# Patient Record
Sex: FEMALE | Race: WHITE | NOT HISPANIC OR LATINO | Employment: UNEMPLOYED | URBAN - METROPOLITAN AREA
[De-identification: names, ages, dates, MRNs, and addresses within clinical notes are randomized per-mention and may not be internally consistent; named-entity substitution may affect disease eponyms.]

---

## 2017-01-17 ENCOUNTER — CONVERSION ENCOUNTER (OUTPATIENT)
Dept: MAMMOGRAPHY | Facility: HOSPITAL | Age: 42
End: 2017-01-17

## 2018-10-04 ENCOUNTER — TRANSCRIBE ORDERS (OUTPATIENT)
Dept: ORTHOPEDIC SURGERY | Facility: CLINIC | Age: 43
End: 2018-10-04

## 2018-10-04 DIAGNOSIS — N63.0 LUMP OF BREAST: ICD-10-CM

## 2018-10-04 DIAGNOSIS — N63.0 BREAST NODULE: Primary | ICD-10-CM

## 2018-10-22 ENCOUNTER — HOSPITAL ENCOUNTER (OUTPATIENT)
Dept: MAMMOGRAPHY | Facility: HOSPITAL | Age: 43
Discharge: HOME | End: 2018-10-22
Admitting: OBSTETRICS & GYNECOLOGY

## 2018-10-22 ENCOUNTER — APPOINTMENT (OUTPATIENT)
Dept: OTHER | Facility: HOSPITAL | Age: 43
End: 2018-10-22

## 2018-10-22 ENCOUNTER — HOSPITAL ENCOUNTER (OUTPATIENT)
Dept: ULTRASOUND IMAGING | Facility: HOSPITAL | Age: 43
Discharge: HOME | End: 2018-10-22

## 2018-10-22 DIAGNOSIS — N63.0 LUMP OF BREAST: ICD-10-CM

## 2018-10-22 DIAGNOSIS — N63.0 BREAST NODULE: ICD-10-CM

## 2018-10-22 DIAGNOSIS — Z92.89 HX OF MAMMOGRAM: ICD-10-CM

## 2018-10-22 PROCEDURE — 76642 ULTRASOUND BREAST LIMITED: CPT

## 2018-10-22 PROCEDURE — 77062 BREAST TOMOSYNTHESIS BI: CPT | Performed by: RADIOLOGY

## 2018-10-22 PROCEDURE — 77066 DX MAMMO INCL CAD BI: CPT | Performed by: RADIOLOGY

## 2018-10-22 PROCEDURE — G0279 TOMOSYNTHESIS, MAMMO: HCPCS

## 2018-10-22 PROCEDURE — 77066 DX MAMMO INCL CAD BI: CPT

## 2018-10-22 PROCEDURE — 76642 ULTRASOUND BREAST LIMITED: CPT | Performed by: RADIOLOGY

## 2018-12-05 ENCOUNTER — TRANSCRIBE ORDERS (OUTPATIENT)
Dept: ADMINISTRATIVE | Facility: HOSPITAL | Age: 43
End: 2018-12-05

## 2018-12-05 DIAGNOSIS — Z12.31 VISIT FOR SCREENING MAMMOGRAM: Primary | ICD-10-CM

## 2019-02-25 ENCOUNTER — HOSPITAL ENCOUNTER (OUTPATIENT)
Dept: MAMMOGRAPHY | Facility: HOSPITAL | Age: 44
Discharge: HOME | End: 2019-02-25
Admitting: OBSTETRICS & GYNECOLOGY

## 2019-02-25 DIAGNOSIS — Z12.31 VISIT FOR SCREENING MAMMOGRAM: ICD-10-CM

## 2019-02-25 PROCEDURE — 77067 SCR MAMMO BI INCL CAD: CPT

## 2019-02-25 PROCEDURE — 77063 BREAST TOMOSYNTHESIS BI: CPT | Performed by: RADIOLOGY

## 2019-02-25 PROCEDURE — 77067 SCR MAMMO BI INCL CAD: CPT | Performed by: RADIOLOGY

## 2019-02-25 PROCEDURE — 77063 BREAST TOMOSYNTHESIS BI: CPT

## 2019-07-10 ENCOUNTER — HOSPITAL ENCOUNTER (OUTPATIENT)
Dept: HOSPITAL 22 - PT | Age: 44
Discharge: HOME | End: 2019-07-10
Payer: COMMERCIAL

## 2019-07-10 DIAGNOSIS — M47.817: Primary | ICD-10-CM

## 2019-07-10 PROCEDURE — 97010 HOT OR COLD PACKS THERAPY: CPT

## 2019-07-10 PROCEDURE — 97164 PT RE-EVAL EST PLAN CARE: CPT

## 2019-07-10 PROCEDURE — 97163 PT EVAL HIGH COMPLEX 45 MIN: CPT

## 2019-07-10 PROCEDURE — 97014 ELECTRIC STIMULATION THERAPY: CPT

## 2019-07-10 PROCEDURE — G0283 ELEC STIM OTHER THAN WOUND: HCPCS

## 2019-07-10 PROCEDURE — 97110 THERAPEUTIC EXERCISES: CPT

## 2019-07-10 PROCEDURE — 97035 APP MDLTY 1+ULTRASOUND EA 15: CPT

## 2019-07-10 PROCEDURE — 97140 MANUAL THERAPY 1/> REGIONS: CPT

## 2020-08-21 ENCOUNTER — HOSPITAL ENCOUNTER (EMERGENCY)
Age: 45
Discharge: HOME | End: 2020-08-21
Payer: MEDICAID

## 2020-08-21 VITALS
DIASTOLIC BLOOD PRESSURE: 85 MMHG | OXYGEN SATURATION: 100 % | SYSTOLIC BLOOD PRESSURE: 124 MMHG | HEART RATE: 80 BPM | TEMPERATURE: 98.06 F | RESPIRATION RATE: 17 BRPM

## 2020-08-21 VITALS
DIASTOLIC BLOOD PRESSURE: 71 MMHG | OXYGEN SATURATION: 100 % | HEART RATE: 69 BPM | RESPIRATION RATE: 20 BRPM | SYSTOLIC BLOOD PRESSURE: 121 MMHG

## 2020-08-21 VITALS — SYSTOLIC BLOOD PRESSURE: 124 MMHG | HEART RATE: 69 BPM | OXYGEN SATURATION: 99 % | DIASTOLIC BLOOD PRESSURE: 64 MMHG

## 2020-08-21 VITALS
TEMPERATURE: 98.24 F | OXYGEN SATURATION: 98 % | HEART RATE: 73 BPM | DIASTOLIC BLOOD PRESSURE: 74 MMHG | SYSTOLIC BLOOD PRESSURE: 126 MMHG | RESPIRATION RATE: 17 BRPM

## 2020-08-21 VITALS — HEART RATE: 88 BPM | DIASTOLIC BLOOD PRESSURE: 89 MMHG | SYSTOLIC BLOOD PRESSURE: 140 MMHG | OXYGEN SATURATION: 99 %

## 2020-08-21 VITALS
HEART RATE: 84 BPM | OXYGEN SATURATION: 99 % | RESPIRATION RATE: 20 BRPM | SYSTOLIC BLOOD PRESSURE: 131 MMHG | DIASTOLIC BLOOD PRESSURE: 65 MMHG

## 2020-08-21 VITALS — BODY MASS INDEX: 25.7 KG/M2

## 2020-08-21 DIAGNOSIS — I10: ICD-10-CM

## 2020-08-21 DIAGNOSIS — R22.1: Primary | ICD-10-CM

## 2020-08-21 LAB
ALBUMIN LEVEL: 4.3 G/DL (ref 3.5–5)
ALBUMIN/GLOB SERPL: 1.7 {RATIO} (ref 1.1–1.8)
ALP ISO SERPL-ACNC: 30 U/L (ref 38–126)
ALT SERPLBLD-CCNC: 12 U/L (ref 12–78)
ANION GAP SERPL CALC-SCNC: 11.8 MEQ/L (ref 5–15)
AST SERPL QL: 22 U/L (ref 14–36)
BILIRUBIN,TOTAL: 1 MG/DL (ref 0.2–1.3)
BUN SERPL-MCNC: 8 MG/DL (ref 7–17)
CALCIUM SPEC-MCNC: 9.8 MG/DL (ref 8.4–10.2)
CHLORIDE SPEC-SCNC: 103 MMOL/L (ref 98–107)
CO2 SERPL-SCNC: 26 MMOL/L (ref 22–30)
CREAT BLD-SCNC: 0.8 MG/DL (ref 0.52–1.04)
CREATININE CLEARANCE ESTIMATED: 93 ML/MIN (ref 50–200)
ESTIMATED GLOMERULAR FILT RATE: 77 ML/MIN (ref 60–?)
GFR (AFRICAN AMERICAN): 93 ML/MIN (ref 60–?)
GLOBULIN SER CALC-MCNC: 2.5 G/DL (ref 1.3–3.2)
GLUCOSE: 93 MG/DL (ref 74–100)
HCT VFR BLD CALC: 37.5 % (ref 37–47)
HGB BLD-MCNC: 13.2 G/DL (ref 12.2–16.2)
MCHC RBC-ENTMCNC: 35.1 G/DL (ref 31.8–35.4)
MCV RBC: 90.3 FL (ref 81–99)
MEAN CORPUSCULAR HEMOGLOBIN: 31.7 PG (ref 27–31.2)
PLATELET # BLD: 337 K/MM3 (ref 142–424)
POTASSIUM: 3.8 MMOL/L (ref 3.5–5.1)
PROT SERPL-MCNC: 6.8 G/DL (ref 6.3–8.2)
RBC # BLD AUTO: 4.15 M/MM3 (ref 4.2–5.4)
SODIUM SPEC-SCNC: 137 MMOL/L (ref 136–145)
WBC # BLD AUTO: 7.9 K/MM3 (ref 4.8–10.8)

## 2020-08-21 PROCEDURE — 96375 TX/PRO/DX INJ NEW DRUG ADDON: CPT

## 2020-08-21 PROCEDURE — 85025 COMPLETE CBC W/AUTO DIFF WBC: CPT

## 2020-08-21 PROCEDURE — 87430 STREP A AG IA: CPT

## 2020-08-21 PROCEDURE — 96374 THER/PROPH/DIAG INJ IV PUSH: CPT

## 2020-08-21 PROCEDURE — 72052 X-RAY EXAM NECK SPINE 6/>VWS: CPT

## 2020-08-21 PROCEDURE — 80053 COMPREHEN METABOLIC PANEL: CPT

## 2020-08-21 PROCEDURE — 70360 X-RAY EXAM OF NECK: CPT

## 2020-08-21 PROCEDURE — 99283 EMERGENCY DEPT VISIT LOW MDM: CPT

## 2021-03-31 ENCOUNTER — HOSPITAL ENCOUNTER (OUTPATIENT)
Facility: HOSPITAL | Age: 46
Setting detail: OBSERVATION
Discharge: HOME | End: 2021-04-01
Attending: EMERGENCY MEDICINE | Admitting: INTERNAL MEDICINE

## 2021-03-31 DIAGNOSIS — T14.91XA SUICIDE ATTEMPT (HCC): Primary | ICD-10-CM

## 2021-03-31 DIAGNOSIS — T50.902A MEDICATION OVERDOSE, INTENTIONAL SELF-HARM, INITIAL ENCOUNTER (HCC): ICD-10-CM

## 2021-03-31 DIAGNOSIS — R41.82 ALTERED MENTAL STATUS, UNSPECIFIED ALTERED MENTAL STATUS TYPE: ICD-10-CM

## 2021-03-31 PROCEDURE — 93005 ELECTROCARDIOGRAM TRACING: CPT | Performed by: EMERGENCY MEDICINE

## 2021-03-31 PROCEDURE — 99285 EMERGENCY DEPT VISIT HI MDM: CPT

## 2021-04-01 VITALS
BODY MASS INDEX: 26.12 KG/M2 | DIASTOLIC BLOOD PRESSURE: 68 MMHG | TEMPERATURE: 96.7 F | SYSTOLIC BLOOD PRESSURE: 106 MMHG | RESPIRATION RATE: 16 BRPM | HEART RATE: 72 BPM | HEIGHT: 64 IN | OXYGEN SATURATION: 96 % | WEIGHT: 153 LBS

## 2021-04-01 PROBLEM — T14.91XA SUICIDE ATTEMPT (HCC): Status: ACTIVE | Noted: 2021-04-01

## 2021-04-01 PROBLEM — T48.201A OVERDOSE OF MUSCLE RELAXANT: Status: ACTIVE | Noted: 2021-04-01

## 2021-04-01 PROBLEM — T14.91XA SUICIDE ATTEMPT (HCC): Status: RESOLVED | Noted: 2021-04-01 | Resolved: 2021-04-01

## 2021-04-01 PROBLEM — T48.201A OVERDOSE OF MUSCLE RELAXANT: Status: RESOLVED | Noted: 2021-04-01 | Resolved: 2021-04-01

## 2021-04-01 PROBLEM — G89.4 CHRONIC PAIN SYNDROME: Status: ACTIVE | Noted: 2021-04-01

## 2021-04-01 LAB
ALBUMIN SERPL-MCNC: 4.5 G/DL (ref 3.5–5.2)
ALBUMIN/GLOB SERPL: 1.8 G/DL
ALP SERPL-CCNC: 45 U/L (ref 39–117)
ALT SERPL W P-5'-P-CCNC: 14 U/L (ref 1–33)
AMPHET+METHAMPHET UR QL: NEGATIVE
AMPHETAMINES UR QL: NEGATIVE
ANION GAP SERPL CALCULATED.3IONS-SCNC: 10 MMOL/L (ref 5–15)
APAP SERPL-MCNC: <5 MCG/ML (ref 0–30)
APAP SERPL-MCNC: <5 MCG/ML (ref 0–30)
AST SERPL-CCNC: 18 U/L (ref 1–32)
BARBITURATES UR QL SCN: NEGATIVE
BASOPHILS # BLD AUTO: 0.04 10*3/MM3 (ref 0–0.2)
BASOPHILS NFR BLD AUTO: 0.7 % (ref 0–1.5)
BENZODIAZ UR QL SCN: NEGATIVE
BILIRUB SERPL-MCNC: 0.2 MG/DL (ref 0–1.2)
BUN SERPL-MCNC: 10 MG/DL (ref 6–20)
BUN/CREAT SERPL: 10.1 (ref 7–25)
BUPRENORPHINE SERPL-MCNC: NEGATIVE NG/ML
CALCIUM SPEC-SCNC: 9.7 MG/DL (ref 8.6–10.5)
CANNABINOIDS SERPL QL: NEGATIVE
CHLORIDE SERPL-SCNC: 106 MMOL/L (ref 98–107)
CO2 SERPL-SCNC: 28 MMOL/L (ref 22–29)
COCAINE UR QL: NEGATIVE
CREAT SERPL-MCNC: 0.99 MG/DL (ref 0.57–1)
DEPRECATED RDW RBC AUTO: 42.7 FL (ref 37–54)
EOSINOPHIL # BLD AUTO: 0.11 10*3/MM3 (ref 0–0.4)
EOSINOPHIL NFR BLD AUTO: 1.8 % (ref 0.3–6.2)
ERYTHROCYTE [DISTWIDTH] IN BLOOD BY AUTOMATED COUNT: 12.5 % (ref 12.3–15.4)
ETHANOL BLD-MCNC: 142 MG/DL (ref 0–10)
FLUAV RNA RESP QL NAA+PROBE: NOT DETECTED
FLUBV RNA RESP QL NAA+PROBE: NOT DETECTED
GFR SERPL CREATININE-BSD FRML MDRD: 60 ML/MIN/1.73
GLOBULIN UR ELPH-MCNC: 2.5 GM/DL
GLUCOSE SERPL-MCNC: 91 MG/DL (ref 65–99)
HCT VFR BLD AUTO: 39.9 % (ref 34–46.6)
HGB BLD-MCNC: 13 G/DL (ref 12–15.9)
IMM GRANULOCYTES # BLD AUTO: 0.02 10*3/MM3 (ref 0–0.05)
IMM GRANULOCYTES NFR BLD AUTO: 0.3 % (ref 0–0.5)
LYMPHOCYTES # BLD AUTO: 2.71 10*3/MM3 (ref 0.7–3.1)
LYMPHOCYTES NFR BLD AUTO: 44.6 % (ref 19.6–45.3)
MCH RBC QN AUTO: 30.5 PG (ref 26.6–33)
MCHC RBC AUTO-ENTMCNC: 32.6 G/DL (ref 31.5–35.7)
MCV RBC AUTO: 93.7 FL (ref 79–97)
METHADONE UR QL SCN: NEGATIVE
MONOCYTES # BLD AUTO: 0.52 10*3/MM3 (ref 0.1–0.9)
MONOCYTES NFR BLD AUTO: 8.6 % (ref 5–12)
NEUTROPHILS NFR BLD AUTO: 2.67 10*3/MM3 (ref 1.7–7)
NEUTROPHILS NFR BLD AUTO: 44 % (ref 42.7–76)
NRBC BLD AUTO-RTO: 0 /100 WBC (ref 0–0.2)
OPIATES UR QL: NEGATIVE
OXYCODONE UR QL SCN: NEGATIVE
PCP UR QL SCN: NEGATIVE
PLATELET # BLD AUTO: 390 10*3/MM3 (ref 140–450)
PMV BLD AUTO: 9.9 FL (ref 6–12)
POTASSIUM SERPL-SCNC: 4.1 MMOL/L (ref 3.5–5.2)
PROPOXYPH UR QL: NEGATIVE
PROT SERPL-MCNC: 7 G/DL (ref 6–8.5)
QT INTERVAL: 400 MS
QTC INTERVAL: 452 MS
RBC # BLD AUTO: 4.26 10*6/MM3 (ref 3.77–5.28)
SALICYLATES SERPL-MCNC: <0.3 MG/DL
SARS-COV-2 RNA RESP QL NAA+PROBE: NOT DETECTED
SODIUM SERPL-SCNC: 144 MMOL/L (ref 136–145)
TRICYCLICS UR QL SCN: NEGATIVE
WBC # BLD AUTO: 6.07 10*3/MM3 (ref 3.4–10.8)

## 2021-04-01 PROCEDURE — 80179 DRUG ASSAY SALICYLATE: CPT | Performed by: EMERGENCY MEDICINE

## 2021-04-01 PROCEDURE — 80143 DRUG ASSAY ACETAMINOPHEN: CPT | Performed by: EMERGENCY MEDICINE

## 2021-04-01 PROCEDURE — 96374 THER/PROPH/DIAG INJ IV PUSH: CPT

## 2021-04-01 PROCEDURE — 80053 COMPREHEN METABOLIC PANEL: CPT | Performed by: EMERGENCY MEDICINE

## 2021-04-01 PROCEDURE — G0378 HOSPITAL OBSERVATION PER HR: HCPCS

## 2021-04-01 PROCEDURE — 80306 DRUG TEST PRSMV INSTRMNT: CPT | Performed by: EMERGENCY MEDICINE

## 2021-04-01 PROCEDURE — 80143 DRUG ASSAY ACETAMINOPHEN: CPT | Performed by: INTERNAL MEDICINE

## 2021-04-01 PROCEDURE — 96372 THER/PROPH/DIAG INJ SC/IM: CPT

## 2021-04-01 PROCEDURE — 99217 PR OBSERVATION CARE DISCHARGE MANAGEMENT: CPT | Performed by: NURSE PRACTITIONER

## 2021-04-01 PROCEDURE — 85025 COMPLETE CBC W/AUTO DIFF WBC: CPT | Performed by: EMERGENCY MEDICINE

## 2021-04-01 PROCEDURE — 99220 PR INITIAL OBSERVATION CARE/DAY 70 MINUTES: CPT | Performed by: INTERNAL MEDICINE

## 2021-04-01 PROCEDURE — 25010000002 ONDANSETRON PER 1 MG: Performed by: INTERNAL MEDICINE

## 2021-04-01 PROCEDURE — 25010000002 THIAMINE PER 100 MG: Performed by: INTERNAL MEDICINE

## 2021-04-01 PROCEDURE — 82077 ASSAY SPEC XCP UR&BREATH IA: CPT | Performed by: EMERGENCY MEDICINE

## 2021-04-01 PROCEDURE — 87636 SARSCOV2 & INF A&B AMP PRB: CPT | Performed by: INTERNAL MEDICINE

## 2021-04-01 PROCEDURE — 25010000002 HEPARIN (PORCINE) PER 1000 UNITS: Performed by: INTERNAL MEDICINE

## 2021-04-01 RX ORDER — OMEPRAZOLE 40 MG/1
40 CAPSULE, DELAYED RELEASE ORAL DAILY
COMMUNITY

## 2021-04-01 RX ORDER — OXYCODONE HYDROCHLORIDE 5 MG/1
10 TABLET ORAL EVERY 6 HOURS PRN
Status: DISCONTINUED | OUTPATIENT
Start: 2021-04-01 | End: 2021-04-01 | Stop reason: HOSPADM

## 2021-04-01 RX ORDER — HEPARIN SODIUM 5000 [USP'U]/ML
5000 INJECTION, SOLUTION INTRAVENOUS; SUBCUTANEOUS EVERY 8 HOURS SCHEDULED
Status: DISCONTINUED | OUTPATIENT
Start: 2021-04-01 | End: 2021-04-01 | Stop reason: HOSPADM

## 2021-04-01 RX ORDER — FLUOXETINE HYDROCHLORIDE 20 MG/1
40 CAPSULE ORAL 2 TIMES DAILY
COMMUNITY
End: 2021-11-16

## 2021-04-01 RX ORDER — SODIUM CHLORIDE 0.9 % (FLUSH) 0.9 %
10 SYRINGE (ML) INJECTION AS NEEDED
Status: DISCONTINUED | OUTPATIENT
Start: 2021-04-01 | End: 2021-04-01 | Stop reason: HOSPADM

## 2021-04-01 RX ORDER — LISINOPRIL 10 MG/1
10 TABLET ORAL NIGHTLY
COMMUNITY

## 2021-04-01 RX ORDER — ONDANSETRON 2 MG/ML
4 INJECTION INTRAMUSCULAR; INTRAVENOUS EVERY 6 HOURS PRN
Status: DISCONTINUED | OUTPATIENT
Start: 2021-04-01 | End: 2021-04-01 | Stop reason: HOSPADM

## 2021-04-01 RX ORDER — LAMOTRIGINE 100 MG/1
100 TABLET ORAL NIGHTLY
Status: DISCONTINUED | OUTPATIENT
Start: 2021-04-01 | End: 2021-04-01 | Stop reason: HOSPADM

## 2021-04-01 RX ORDER — LURASIDONE HYDROCHLORIDE 40 MG/1
40 TABLET, FILM COATED ORAL DAILY
COMMUNITY

## 2021-04-01 RX ORDER — OXYCODONE HYDROCHLORIDE 10 MG/1
10 TABLET ORAL EVERY 6 HOURS PRN
COMMUNITY
End: 2021-11-16

## 2021-04-01 RX ORDER — TIZANIDINE 4 MG/1
2 TABLET ORAL NIGHTLY PRN
COMMUNITY
End: 2021-04-01 | Stop reason: HOSPADM

## 2021-04-01 RX ORDER — SODIUM CHLORIDE 0.9 % (FLUSH) 0.9 %
10 SYRINGE (ML) INJECTION EVERY 12 HOURS SCHEDULED
Status: DISCONTINUED | OUTPATIENT
Start: 2021-04-01 | End: 2021-04-01 | Stop reason: HOSPADM

## 2021-04-01 RX ORDER — CLONIDINE HYDROCHLORIDE 0.1 MG/1
0.1 TABLET ORAL NIGHTLY PRN
COMMUNITY

## 2021-04-01 RX ORDER — FLUOXETINE HYDROCHLORIDE 20 MG/1
40 CAPSULE ORAL EVERY 12 HOURS SCHEDULED
Status: DISCONTINUED | OUTPATIENT
Start: 2021-04-01 | End: 2021-04-01 | Stop reason: HOSPADM

## 2021-04-01 RX ORDER — LAMOTRIGINE 100 MG/1
100 TABLET ORAL NIGHTLY
COMMUNITY

## 2021-04-01 RX ADMIN — THIAMINE HYDROCHLORIDE 100 ML/HR: 100 INJECTION, SOLUTION INTRAMUSCULAR; INTRAVENOUS at 08:56

## 2021-04-01 RX ADMIN — HEPARIN SODIUM 5000 UNITS: 5000 INJECTION INTRAVENOUS; SUBCUTANEOUS at 04:48

## 2021-04-01 RX ADMIN — ONDANSETRON 4 MG: 2 INJECTION INTRAMUSCULAR; INTRAVENOUS at 04:46

## 2021-04-01 RX ADMIN — SODIUM CHLORIDE, PRESERVATIVE FREE 10 ML: 5 INJECTION INTRAVENOUS at 08:56

## 2021-09-08 ENCOUNTER — TELEPHONE (OUTPATIENT)
Dept: NEUROSURGERY | Facility: CLINIC | Age: 46
End: 2021-09-08

## 2021-09-08 NOTE — TELEPHONE ENCOUNTER
Caller: Nisha Lou    Relationship to patient: Self    Best call back number: [phone]   Patient is needing: PATIENT STATES THAT A REFERRAL WAS SENT [phone] ON 8/26/21 FROM DR. NIX FOR DX SEVERE CARPAL TUNNEL.    PLEASE CALL THE PATIENT TO SCHEDULE @  [phone]     THANK YOU

## 2021-11-16 ENCOUNTER — OFFICE VISIT (OUTPATIENT)
Dept: NEUROSURGERY | Facility: CLINIC | Age: 46
End: 2021-11-16

## 2021-11-16 VITALS
TEMPERATURE: 97.5 F | HEART RATE: 66 BPM | BODY MASS INDEX: 27.55 KG/M2 | OXYGEN SATURATION: 98 % | WEIGHT: 161.4 LBS | HEIGHT: 64 IN

## 2021-11-16 DIAGNOSIS — G56.02 CARPAL TUNNEL SYNDROME OF LEFT WRIST: Primary | ICD-10-CM

## 2021-11-16 PROCEDURE — 99204 OFFICE O/P NEW MOD 45 MIN: CPT | Performed by: PHYSICIAN ASSISTANT

## 2021-11-16 RX ORDER — LORAZEPAM 1 MG/1
1 TABLET ORAL AS NEEDED
COMMUNITY
End: 2021-12-22 | Stop reason: HOSPADM

## 2021-11-16 RX ORDER — FLUOXETINE HYDROCHLORIDE 40 MG/1
40 CAPSULE ORAL 2 TIMES DAILY
COMMUNITY

## 2021-11-16 RX ORDER — ESOMEPRAZOLE MAGNESIUM 20 MG/1
40 TABLET, DELAYED RELEASE ORAL
COMMUNITY

## 2021-11-16 RX ORDER — SUMATRIPTAN 50 MG/1
50 TABLET, FILM COATED ORAL AS NEEDED
COMMUNITY

## 2021-11-16 RX ORDER — CETIRIZINE HYDROCHLORIDE 10 MG/1
10 TABLET ORAL DAILY PRN
COMMUNITY
Start: 2021-08-11

## 2021-11-16 RX ORDER — FLUTICASONE PROPIONATE 50 MCG
2 SPRAY, SUSPENSION (ML) NASAL DAILY PRN
COMMUNITY

## 2021-11-16 RX ORDER — OXYCODONE AND ACETAMINOPHEN 10; 325 MG/1; MG/1
1 TABLET ORAL EVERY 6 HOURS PRN
COMMUNITY
End: 2022-01-05

## 2021-11-16 NOTE — PATIENT INSTRUCTIONS

## 2021-11-16 NOTE — PROGRESS NOTES
Patient: Nisha Lou  : 1975  GENDER: female    Primary Care Provider: Rosemary Dahl APRN    Requesting Provider: As above      History    Chief Complaint: left hand numbness and tingling     History of Present Illness: Ms. Lou is a 46-year-old, RHD, female with a PMHx significant for HTN - who previously worked in billing.  She presents to our service with left hand pain with sensory alteration extending along the median nerve distribution x 8-9 months.  Symptoms are constant, however increase with sustained computer work, while driving or at night.  She endorses nocturnal awakenings and a positive flick test.  She has tried wrist splinting without lasting benefit.  Electrodiagnostic studies revealed severe left carpal tunnel syndrome and she is interested in surgery.  She has no other complaints at this time.       Review of Systems   Constitutional: Negative for activity change, appetite change, chills, diaphoresis, fatigue, fever and unexpected weight change.   HENT: Positive for sinus pressure. Negative for congestion, dental problem, drooling, ear discharge, ear pain, facial swelling, hearing loss, mouth sores, nosebleeds, postnasal drip, rhinorrhea, sinus pain, sneezing, sore throat, tinnitus, trouble swallowing and voice change.    Eyes: Positive for itching. Negative for photophobia, pain, discharge, redness and visual disturbance.   Respiratory: Negative for apnea, cough, choking, chest tightness, shortness of breath, wheezing and stridor.    Cardiovascular: Negative for chest pain, palpitations and leg swelling.   Gastrointestinal: Positive for constipation. Negative for abdominal distention, abdominal pain, anal bleeding, blood in stool, diarrhea, nausea, rectal pain and vomiting.   Endocrine: Negative for cold intolerance, heat intolerance, polydipsia, polyphagia and polyuria.   Genitourinary: Negative for decreased urine volume, difficulty urinating, dysuria, enuresis, flank  pain, frequency, genital sores, hematuria and urgency.   Musculoskeletal: Positive for back pain, neck pain and neck stiffness. Negative for arthralgias, gait problem, joint swelling and myalgias.   Skin: Negative for color change, pallor, rash and wound.   Allergic/Immunologic: Positive for environmental allergies. Negative for food allergies and immunocompromised state.   Neurological: Positive for headaches. Negative for dizziness, tremors, seizures, syncope, facial asymmetry, speech difficulty, weakness, light-headedness and numbness.   Hematological: Negative for adenopathy. Does not bruise/bleed easily.   Psychiatric/Behavioral: Positive for agitation, behavioral problems and decreased concentration. Negative for confusion, dysphoric mood, hallucinations, self-injury, sleep disturbance and suicidal ideas. The patient is nervous/anxious. The patient is not hyperactive.    All other systems reviewed and are negative.      Past Medical History:   Diagnosis Date   • Anemia    • Asthma    • Back problem    • Depression    • Headache    • Hypertension      Past Surgical History:   Procedure Laterality Date   • APPENDECTOMY  2017   • AUGMENTATION MAMMAPLASTY      -SALINE   •  SECTION      x3       Current Outpatient Medications:   •  cetirizine (zyrTEC) 10 MG tablet, Take 10 mg by mouth Daily., Disp: , Rfl:   •  cloNIDine (CATAPRES) 0.1 MG tablet, Take 0.1 mg by mouth Every Night., Disp: , Rfl:   •  esomeprazole (nexIUM) 40 MG capsule, esomeprazole magnesium 40 mg capsule,delayed release  TAKE 1 CAPSULE BY MOUTH ONCE DAILY, Disp: , Rfl:   •  FLUoxetine (PROzac) 40 MG capsule, Take 40 mg by mouth 2 (Two) Times a Day., Disp: , Rfl:   •  fluticasone (FLONASE) 50 MCG/ACT nasal spray, As Needed., Disp: , Rfl:   •  lamoTRIgine (LaMICtal) 100 MG tablet, Take 100 mg by mouth Every Night., Disp: , Rfl:   •  lisinopril (PRINIVIL,ZESTRIL) 10 MG tablet, Take 10 mg by mouth Daily., Disp: , Rfl:   •  LORazepam (ATIVAN)  "1 MG tablet, Take 1 mg by mouth As Needed., Disp: , Rfl:   •  lurasidone (LATUDA) 40 MG tablet tablet, Take 40 mg by mouth Daily., Disp: , Rfl:   •  omeprazole (priLOSEC) 40 MG capsule, Take 40 mg by mouth Daily., Disp: , Rfl:   •  oxyCODONE-acetaminophen (PERCOCET)  MG per tablet, Every 6 (Six) Hours As Needed., Disp: , Rfl:   •  SUMAtriptan (IMITREX) 50 MG tablet, Take 50 mg by mouth As Needed for Migraine., Disp: , Rfl:     No Known Allergies    The patient's review of systems, past medical history, past surgical history, family history, and social history have been reviewed at length in the electronic medical record.    Physical Exam:   Pulse 66   Temp 97.5 °F (36.4 °C)   Ht 162.6 cm (64.02\")   Wt 73.2 kg (161 lb 6.4 oz)   SpO2 98%   BMI 27.69 kg/m²   CONSTITUTIONAL:   - Patient is well-nourished  - Pleasant and appears stated age.  PSYCHIATRIC:  - Normal mood and affect  - Behavior is normal.  - Thought content is normal  HENT:   Head: Normocephalic and Atraumatic.   Eyes:     - Pupils are equal, round, and reactive to light.     - EOM are normal.   CV:   - Regular rate and rhythm on palpable radial pulse   - No murmur appreciated   PULMONARY:   - Speaking in full sentences  - No use of accessory muscles   - Breathing is non-labored   - No wheezing   SKIN:  - Clean, dry and intact   MUSCULOSKELETAL:  - Neck tenderness to palpation is not observed.   - ROM in neck is normal.  NEUROLOGICAL:  - A&Ox3  - Attention span, language function, and cognition are intact.  - Strength is intact in the upper and lower extremities to direct testing.  - Muscle tone is normal throughout.  - Station and gait are normal.  - Sensation is intact to light touch testing throughout.  - Deep tendon reflexes are 1+ and symmetrical.    - Gentile's Sign is negative bilaterally.  - No clonus is elicited.  - Coordination is intact.    B/L UE CTS/CuTS Assessment:  - (+) Tinels (LEFT)  - (+) Phalen's Sign   - (-) No APB atrophy " appreciated  - Good strength with resisted finger abduction   - (-) ulnar clawing   - (-) Froment's Sign   - Sensation intact to light touch  - Vascular intact     Patient's Body mass index is 27.69 kg/m². indicating that she is overweight (BMI 25-29.9). Obesity-related health conditions include the following: hypertension. Obesity is improving with lifestyle modifications. BMI is is above average; BMI management plan is completed. We discussed portion control and increasing exercise..         Medical Decision Making    Data Review:   1. Electrodiagnostic studies (8/12/2021): demonstrated severe left carpal tunnel syndrome.  No evidence of right carpal or bilateral ulnar neuropathies.         Diagnosis/Treatment Options:  1. Carpal tunnel syndrome of left wrist  2. BMI 27.0-27.9,adult       Follow up:  Ms. Lou is seen today with numbness/tingling along the median nerve distribution of her left hand that correlates with her recent EMG study.  After discussing with Dr. Andino we have recommended moving forward with a left carpal tunnel release.  I explained the procedure including associated risks, complications, limitations, and alternatives to surgery.  Patient verbalized understanding and has agreed to proceed.    Liv Daugherty PA-C   11/16/2021   11:41 EST

## 2021-11-19 ENCOUNTER — TELEPHONE (OUTPATIENT)
Dept: NEUROSURGERY | Facility: CLINIC | Age: 46
End: 2021-11-19

## 2021-11-19 PROBLEM — G56.02 CARPAL TUNNEL SYNDROME OF LEFT WRIST: Status: ACTIVE | Noted: 2021-11-19

## 2021-11-19 NOTE — TELEPHONE ENCOUNTER
Caller: GERARDO QUINTANILLA    Relationship to patient: SELF    Best call back number: [phone]    Patient is needing: PATIENT IS CALLING FOR AN UPDATE ON SCHEDULING SURGERY. SHE IS REQUESTING A CALL BACK FROM ANILA.

## 2021-12-20 ENCOUNTER — PRE-ADMISSION TESTING (OUTPATIENT)
Dept: PREADMISSION TESTING | Facility: HOSPITAL | Age: 46
End: 2021-12-20

## 2021-12-20 VITALS — BODY MASS INDEX: 26.43 KG/M2 | WEIGHT: 154.8 LBS | HEIGHT: 64 IN

## 2021-12-20 LAB
QT INTERVAL: 386 MS
QTC INTERVAL: 419 MS

## 2021-12-20 PROCEDURE — 80048 BASIC METABOLIC PNL TOTAL CA: CPT | Performed by: PHYSICIAN ASSISTANT

## 2021-12-20 PROCEDURE — 93005 ELECTROCARDIOGRAM TRACING: CPT

## 2021-12-20 PROCEDURE — U0004 COV-19 TEST NON-CDC HGH THRU: HCPCS | Performed by: PHYSICIAN ASSISTANT

## 2021-12-20 PROCEDURE — 93010 ELECTROCARDIOGRAM REPORT: CPT | Performed by: INTERNAL MEDICINE

## 2021-12-20 PROCEDURE — 85025 COMPLETE CBC W/AUTO DIFF WBC: CPT | Performed by: PHYSICIAN ASSISTANT

## 2021-12-20 RX ORDER — ALBUTEROL SULFATE 90 UG/1
2 AEROSOL, METERED RESPIRATORY (INHALATION) EVERY 4 HOURS PRN
COMMUNITY

## 2021-12-20 NOTE — PAT
Patient to apply Chlorhexadine wipes  to surgical area (as instructed) the night before procedure and the AM of procedure. Wipes provided.    Per Anesthesia Request, patient instructed not to take their ACE/ARB medications on the AM of surgery.    Patient viewed general PAT education video as instructed in their preoperative information received from their surgeon.  Patient stated the general PAT education video was viewed in its entirety and survey completed.  Copies of PAT general education handouts (Incentive Spirometry, Meds to Beds Program, Patient Belongings, Pre-op skin preparation instructions, Blood Glucose testing, Visitor policy, Surgery FAQ, Code H) distributed to patient if not printed. Education related to the PAT pass and skin preparation for surgery (if applicable) completed in PAT as a reinforcement to PAT education video. Patient instructed to return PAT pass provided today as well as completed skin preparation sheet (if applicable) on the day of procedure.     Additionally if patient had not viewed video yet but intended to view it at home or in our waiting area, then referred them to the handout with QR code/link provided during PAT visit.  Instructed patient to complete survey after viewing the video in its entirety.  Encouraged patient/family to read PAT general education handouts thoroughly and notify PAT staff with any questions or concerns. Patient verbalized understanding of all information and priority content.

## 2021-12-21 ENCOUNTER — ANESTHESIA EVENT (OUTPATIENT)
Dept: PERIOP | Facility: HOSPITAL | Age: 46
End: 2021-12-21

## 2021-12-21 RX ORDER — FAMOTIDINE 10 MG/ML
20 INJECTION, SOLUTION INTRAVENOUS ONCE
Status: CANCELLED | OUTPATIENT
Start: 2021-12-21 | End: 2021-12-21

## 2021-12-22 ENCOUNTER — TELEPHONE (OUTPATIENT)
Dept: NEUROSURGERY | Facility: CLINIC | Age: 46
End: 2021-12-22

## 2021-12-22 ENCOUNTER — HOSPITAL ENCOUNTER (OUTPATIENT)
Facility: HOSPITAL | Age: 46
Setting detail: HOSPITAL OUTPATIENT SURGERY
Discharge: HOME | End: 2021-12-22
Attending: NEUROLOGICAL SURGERY | Admitting: NEUROLOGICAL SURGERY

## 2021-12-22 ENCOUNTER — ANESTHESIA (OUTPATIENT)
Dept: PERIOP | Facility: HOSPITAL | Age: 46
End: 2021-12-22

## 2021-12-22 VITALS
OXYGEN SATURATION: 99 % | HEART RATE: 70 BPM | DIASTOLIC BLOOD PRESSURE: 56 MMHG | HEIGHT: 64 IN | WEIGHT: 154 LBS | TEMPERATURE: 98 F | BODY MASS INDEX: 26.29 KG/M2 | RESPIRATION RATE: 16 BRPM | SYSTOLIC BLOOD PRESSURE: 101 MMHG

## 2021-12-22 DIAGNOSIS — G56.02 CARPAL TUNNEL SYNDROME OF LEFT WRIST: ICD-10-CM

## 2021-12-22 LAB
B-HCG UR QL: NEGATIVE
EXPIRATION DATE: NORMAL
INTERNAL NEGATIVE CONTROL: NORMAL
INTERNAL POSITIVE CONTROL: NORMAL
Lab: NORMAL

## 2021-12-22 PROCEDURE — 81025 URINE PREGNANCY TEST: CPT | Performed by: ANESTHESIOLOGY

## 2021-12-22 PROCEDURE — 25010000002 PROPOFOL 10 MG/ML EMULSION: Performed by: NURSE ANESTHETIST, CERTIFIED REGISTERED

## 2021-12-22 PROCEDURE — S0260 H&P FOR SURGERY: HCPCS | Performed by: NEUROLOGICAL SURGERY

## 2021-12-22 PROCEDURE — 0 LIDOCAINE 1 % SOLUTION 20 ML VIAL: Performed by: NEUROLOGICAL SURGERY

## 2021-12-22 PROCEDURE — 64721 CARPAL TUNNEL SURGERY: CPT | Performed by: NEUROLOGICAL SURGERY

## 2021-12-22 PROCEDURE — 0 CEFAZOLIN IN DEXTROSE 2-4 GM/100ML-% SOLUTION: Performed by: PHYSICIAN ASSISTANT

## 2021-12-22 RX ORDER — SODIUM CHLORIDE 0.9 % (FLUSH) 0.9 %
10 SYRINGE (ML) INJECTION EVERY 12 HOURS SCHEDULED
Status: DISCONTINUED | OUTPATIENT
Start: 2021-12-22 | End: 2021-12-22 | Stop reason: HOSPADM

## 2021-12-22 RX ORDER — FENTANYL CITRATE 50 UG/ML
50 INJECTION, SOLUTION INTRAMUSCULAR; INTRAVENOUS
Status: DISCONTINUED | OUTPATIENT
Start: 2021-12-22 | End: 2021-12-22 | Stop reason: HOSPADM

## 2021-12-22 RX ORDER — ACETAMINOPHEN 325 MG/1
650 TABLET ORAL ONCE AS NEEDED
Status: DISCONTINUED | OUTPATIENT
Start: 2021-12-22 | End: 2021-12-22 | Stop reason: SDUPTHER

## 2021-12-22 RX ORDER — FAMOTIDINE 20 MG/1
20 TABLET, FILM COATED ORAL ONCE
Status: COMPLETED | OUTPATIENT
Start: 2021-12-22 | End: 2021-12-22

## 2021-12-22 RX ORDER — FENTANYL CITRATE 50 UG/ML
50 INJECTION, SOLUTION INTRAMUSCULAR; INTRAVENOUS
Status: DISCONTINUED | OUTPATIENT
Start: 2021-12-22 | End: 2021-12-22 | Stop reason: SDUPTHER

## 2021-12-22 RX ORDER — ONDANSETRON 2 MG/ML
4 INJECTION INTRAMUSCULAR; INTRAVENOUS ONCE AS NEEDED
Status: DISCONTINUED | OUTPATIENT
Start: 2021-12-22 | End: 2021-12-22 | Stop reason: HOSPADM

## 2021-12-22 RX ORDER — PROPOFOL 10 MG/ML
VIAL (ML) INTRAVENOUS AS NEEDED
Status: DISCONTINUED | OUTPATIENT
Start: 2021-12-22 | End: 2021-12-22 | Stop reason: SURG

## 2021-12-22 RX ORDER — CEFAZOLIN SODIUM 2 G/100ML
2 INJECTION, SOLUTION INTRAVENOUS ONCE
Status: COMPLETED | OUTPATIENT
Start: 2021-12-22 | End: 2021-12-22

## 2021-12-22 RX ORDER — SODIUM CHLORIDE, SODIUM LACTATE, POTASSIUM CHLORIDE, CALCIUM CHLORIDE 600; 310; 30; 20 MG/100ML; MG/100ML; MG/100ML; MG/100ML
9 INJECTION, SOLUTION INTRAVENOUS CONTINUOUS
Status: DISCONTINUED | OUTPATIENT
Start: 2021-12-22 | End: 2021-12-22 | Stop reason: HOSPADM

## 2021-12-22 RX ORDER — MAGNESIUM HYDROXIDE 1200 MG/15ML
LIQUID ORAL AS NEEDED
Status: DISCONTINUED | OUTPATIENT
Start: 2021-12-22 | End: 2021-12-22 | Stop reason: HOSPADM

## 2021-12-22 RX ORDER — MIDAZOLAM HYDROCHLORIDE 1 MG/ML
1 INJECTION INTRAMUSCULAR; INTRAVENOUS
Status: DISCONTINUED | OUTPATIENT
Start: 2021-12-22 | End: 2021-12-22 | Stop reason: HOSPADM

## 2021-12-22 RX ORDER — HYDROCODONE BITARTRATE AND ACETAMINOPHEN 7.5; 325 MG/1; MG/1
1 TABLET ORAL 3 TIMES DAILY PRN
Qty: 15 TABLET | Refills: 0 | Status: SHIPPED | OUTPATIENT
Start: 2021-12-22 | End: 2022-01-05

## 2021-12-22 RX ORDER — ACETAMINOPHEN 650 MG/1
650 SUPPOSITORY RECTAL ONCE AS NEEDED
Status: DISCONTINUED | OUTPATIENT
Start: 2021-12-22 | End: 2021-12-22 | Stop reason: SDUPTHER

## 2021-12-22 RX ORDER — ONDANSETRON 2 MG/ML
4 INJECTION INTRAMUSCULAR; INTRAVENOUS ONCE AS NEEDED
Status: DISCONTINUED | OUTPATIENT
Start: 2021-12-22 | End: 2021-12-22 | Stop reason: SDUPTHER

## 2021-12-22 RX ORDER — ACETAMINOPHEN 650 MG/1
650 SUPPOSITORY RECTAL ONCE AS NEEDED
Status: DISCONTINUED | OUTPATIENT
Start: 2021-12-22 | End: 2021-12-22 | Stop reason: HOSPADM

## 2021-12-22 RX ORDER — SODIUM CHLORIDE 0.9 % (FLUSH) 0.9 %
10 SYRINGE (ML) INJECTION AS NEEDED
Status: DISCONTINUED | OUTPATIENT
Start: 2021-12-22 | End: 2021-12-22 | Stop reason: HOSPADM

## 2021-12-22 RX ORDER — ACETAMINOPHEN 325 MG/1
650 TABLET ORAL ONCE AS NEEDED
Status: DISCONTINUED | OUTPATIENT
Start: 2021-12-22 | End: 2021-12-22 | Stop reason: HOSPADM

## 2021-12-22 RX ORDER — LIDOCAINE HYDROCHLORIDE 10 MG/ML
0.5 INJECTION, SOLUTION EPIDURAL; INFILTRATION; INTRACAUDAL; PERINEURAL ONCE AS NEEDED
Status: COMPLETED | OUTPATIENT
Start: 2021-12-22 | End: 2021-12-22

## 2021-12-22 RX ADMIN — LIDOCAINE HYDROCHLORIDE 0.5 ML: 10 INJECTION, SOLUTION EPIDURAL; INFILTRATION; INTRACAUDAL; PERINEURAL at 06:24

## 2021-12-22 RX ADMIN — PROPOFOL 100 MG: 10 INJECTION, EMULSION INTRAVENOUS at 07:00

## 2021-12-22 RX ADMIN — PROPOFOL 50 MG: 10 INJECTION, EMULSION INTRAVENOUS at 07:01

## 2021-12-22 RX ADMIN — CEFAZOLIN SODIUM 2 G: 2 INJECTION, SOLUTION INTRAVENOUS at 06:58

## 2021-12-22 RX ADMIN — FAMOTIDINE 20 MG: 20 TABLET ORAL at 06:24

## 2021-12-22 RX ADMIN — SODIUM CHLORIDE, POTASSIUM CHLORIDE, SODIUM LACTATE AND CALCIUM CHLORIDE 9 ML/HR: 600; 310; 30; 20 INJECTION, SOLUTION INTRAVENOUS at 06:24

## 2021-12-22 RX ADMIN — PROPOFOL 111 MCG/KG/MIN: 10 INJECTION, EMULSION INTRAVENOUS at 07:01

## 2021-12-22 NOTE — H&P
Patient Care Team:      Chief complaint: Left hand numbness and tingling    Subjective:  Patient is a 46 y.o.female presents with a 6 month history of worsening left hand numbness and tingling.  No previous hand surgery.  She denies any recent changes in her general health.  Review of Systems:  General ROS: negative  Cardiovascular ROS: no chest pain or dyspnea on exertion  Respiratory ROS: no cough, shortness of breath, or wheezing      Allergies: No Known Allergies       Latex: no  Contrast Dye: no    Home Meds    Medications Prior to Admission   Medication Sig Dispense Refill Last Dose   • albuterol sulfate  (90 Base) MCG/ACT inhaler Inhale 2 puffs Every 4 (Four) Hours As Needed for Wheezing or Shortness of Air.   Past Week at Unknown time   • cetirizine (zyrTEC) 10 MG tablet Take 10 mg by mouth Daily As Needed for Allergies.   Past Month at Unknown time   • cloNIDine (CATAPRES) 0.1 MG tablet Take 0.1 mg by mouth At Night As Needed (sleep).   Past Week at Unknown time   • Esomeprazole Magnesium 20 MG tablet delayed-release Take 40 mg by mouth Every Morning Before Breakfast.   12/21/2021 at 2100   • FLUoxetine (PROzac) 40 MG capsule Take 40 mg by mouth 2 (Two) Times a Day.   12/22/2021 at 0430   • fluticasone (FLONASE) 50 MCG/ACT nasal spray 2 sprays into the nostril(s) as directed by provider Daily As Needed for Allergies.   Past Week at Unknown time   • lamoTRIgine (LaMICtal) 100 MG tablet Take 100 mg by mouth Every Night.   12/21/2021 at 2100   • lisinopril (PRINIVIL,ZESTRIL) 10 MG tablet Take 10 mg by mouth Every Night.   12/21/2021 at 2100   • omeprazole (priLOSEC) 40 MG capsule Take 40 mg by mouth Daily.   12/21/2021 at 2100   • oxyCODONE-acetaminophen (PERCOCET)  MG per tablet Take 1 tablet by mouth Every 6 (Six) Hours As Needed for Moderate Pain .   12/21/2021 at 2100   • LORazepam (ATIVAN) 1 MG tablet Take 1 mg by mouth As Needed (preprocedural on back).      • lurasidone (LATUDA) 40 MG  "tablet tablet Take 40 mg by mouth Daily.      • SUMAtriptan (IMITREX) 50 MG tablet Take 50 mg by mouth As Needed for Migraine.   More than a month at Unknown time     PMH:   Past Medical History:   Diagnosis Date   • Anemia    • Asthma    • Back problem    • Depression    • GERD (gastroesophageal reflux disease)    • Headache    • Hypertension      PSH:    Past Surgical History:   Procedure Laterality Date   • APPENDECTOMY  2017   • AUGMENTATION MAMMAPLASTY      -SALINE   •  SECTION      x3   • COLONOSCOPY       Immunization History: pneumo: no   Flu: no  Tetanus: yes  Social History:   Tobacco: no   Alcohol: no      Physical Exam:Pulse 74   Temp 97.2 °F (36.2 °C) (Temporal)   Resp 18   Ht 162.6 cm (64\")   Wt 69.9 kg (154 lb)   SpO2 98%   BMI 26.43 kg/m²       General Appearance:    Alert, cooperative, no distress, appears stated age   Head:    Normocephalic, without obvious abnormality, atraumatic   Lungs:     Clear to auscultation bilaterally, respirations unlabored    Heart: Regular rate and rhythm, S1 and S2 normal, no murmur, rub    or gallop    Abdomen:    Soft without tenderness   Breast Exam:    deferred   Genitalia:    deferred   Extremities:   Extremities normal, atraumatic, no cyanosis or edema   Skin:   Skin color, texture, turgor normal, no rashes or lesions   Neurologic:   Grossly intact     Results Review: CBC, Chem profile, EKG on chart.  HCG-neg, Covid-neg    Impression: Left carpal tunnel syndrome    Plan: For left carpal tunnel release today.  EDGAR Schaeffer 2021 06:45 EST          "

## 2021-12-22 NOTE — TELEPHONE ENCOUNTER
S/p left carpal tunnel release today with Dr. Andino    Pt called stating she is having a lot of pain from surgery today,she feels like the medication she is taking is not strong enough and is wondering if we could prescribe something stronger. Pt is contemplating on going to the ED.    I spoke with pt, she is having an increase in pain in her hand. Dr. Andino prescribed Norco 7.5mg. Pt is also on chronic pain medication (Percocet 10 q6, prescribed by Dr. Raymond Clayton)    -I advised pt that we do not typically prescribe stronger pain medication for this type of surgery. I explained that the nerve is probably irritated from surgery today and that is what she is likely feeling. She will ice her incision and give us a call tomorrow if symptoms worsen.     -On call EDGAR Patel)

## 2021-12-22 NOTE — ANESTHESIA PREPROCEDURE EVALUATION
Anesthesia Evaluation                  Airway   Mallampati: I  TM distance: >3 FB  Neck ROM: full  No difficulty expected  Dental      Pulmonary    (+) asthma,  Cardiovascular     ECG reviewed    (+) hypertension,       Neuro/Psych  (+) headaches, numbness, psychiatric history Anxiety and Depression,     GI/Hepatic/Renal/Endo    (+)  GERD,      Musculoskeletal     Abdominal    Substance History      OB/GYN          Other                        Anesthesia Plan    ASA 2     general     intravenous induction     Anesthetic plan, all risks, benefits, and alternatives have been provided, discussed and informed consent has been obtained with: patient.    Plan discussed with CRNA.

## 2021-12-22 NOTE — OP NOTE
NEUROSURGICAL OPERATIVE NOTE        PREOPERATIVE DIAGNOSIS:    Left carpal tunnel syndrome      POSTOPERATIVE DIAGNOSIS:  Same      PROCEDURE:  Left carpal tunnel release      SURGEON:  Oneal Andino M.D.      ANESTHESIA: Local/MAC      ESTIMATED BLOOD LOSS: Minimal      SPECIMEN: None      DRAINS: None      COMPLICATIONS:  None      CLINICAL NOTE:  The patient is a 46-year-old woman with ongoing left hand pain and sensory alteration.  Studies documents severe left carpal tunnel syndrome and as such she presents at this time for left carpal tunnel release.  The nature of the procedure as well as the potential risks, complications, limitations, and alternatives to the procedure were discussed at length with the patient and the patient has agreed to proceed with surgery.      TECHNICAL NOTE:  The patient was brought to the operating room and placed on the operating room table in the supine position. She was provided sedation. A tourniquet was placed about her left upper extremity. Her left upper extremity from the elbow to the fingertips had been prepared and draped in the usual fashion. A linear incision was marked in the left palm in line with the fourth digit. This was infiltrated with 1% lidocaine with sodium bicarbonate. The tourniquet was inflated. The incision was fashioned. The soft tissues were divided to provide exposure to the transverse carpal ligament, which was divided sharply using scalpel and tenotomy scissors. Dissection was conducted proximally and then distally until palmar fat was identified. The tourniquet was lowered. Modest bleeding points were controlled with bipolar cautery. The wound was washed out with a saline solution. The incision was then closed in an interrupted vertical mattress fashion with 4-0 nylon suture. Sterile dressing was applied. She did receive preoperative antibiotics. She was subsequently taken to the recovery room in satisfactory condition.             Oneal HUTCHINS  ROGELIO Andino.

## 2021-12-23 NOTE — TELEPHONE ENCOUNTER
Called to speak with patient but unable to reach.  I left her a voice message and asked her to call us back if she was still having issues with her pain.  Advised to use ice for 20 min at a time and if need can add an extra-strength Tylenol to her Norco.

## 2021-12-23 NOTE — TELEPHONE ENCOUNTER
I cannot give her any other medications but with her type of surgery if she will elevate her hand above the level of her heart this will help with the pain as well as application of ice.

## 2022-01-05 ENCOUNTER — OFFICE VISIT (OUTPATIENT)
Dept: NEUROSURGERY | Facility: CLINIC | Age: 47
End: 2022-01-05

## 2022-01-05 VITALS
HEIGHT: 64 IN | DIASTOLIC BLOOD PRESSURE: 52 MMHG | BODY MASS INDEX: 26.8 KG/M2 | TEMPERATURE: 97.6 F | WEIGHT: 157 LBS | SYSTOLIC BLOOD PRESSURE: 100 MMHG

## 2022-01-05 DIAGNOSIS — Z98.890 S/P CARPAL TUNNEL RELEASE: ICD-10-CM

## 2022-01-05 DIAGNOSIS — G56.02 CARPAL TUNNEL SYNDROME OF LEFT WRIST: Primary | ICD-10-CM

## 2022-01-05 PROCEDURE — 99024 POSTOP FOLLOW-UP VISIT: CPT | Performed by: PHYSICIAN ASSISTANT

## 2022-01-05 NOTE — PROGRESS NOTES
Subjective     Chief Complaint: Status post left carpal tunnel release, suture removal    Patient ID: Nisha Lou is a 46 y.o. female is here today for follow-up.    History of Present Illness    Ms. Humphrey is a 46-year-old woman who presented to our office with left hand numbness and tingling x9 months.  She failed conservative treatment including medication and splinting.  EMG demonstrated severe left-sided carpal tunnel syndrome and she underwent carpal tunnel release on 12/22/2021.  She has done very well since surgery with good relief of her left sided hand pain.  She said she has had occasional twinges of numbness but nothing persistent and she is overall very pleased with her progress.  She had some swelling and minimal drainage of her incision in the first few postop days, but states that it has been clean dry and intact for at least the last week.    The following portions of the patient's history were reviewed and updated as appropriate: allergies, current medications, past family history, past medical history, past social history, past surgical history and problem list.    Family history:   Family History   Problem Relation Age of Onset   • Breast cancer Mother 49   • Cancer Mother    • Hypertension Mother    • Diabetes Mother    • Asthma Maternal Grandmother    • Cancer Maternal Grandmother    • Heart disease Maternal Grandmother    • Hypertension Maternal Grandmother    • Diabetes Maternal Grandmother    • Ovarian cancer Neg Hx        Social history:   Social History     Socioeconomic History   • Marital status: Single   Tobacco Use   • Smoking status: Never Smoker   • Smokeless tobacco: Never Used   Vaping Use   • Vaping Use: Never used   Substance and Sexual Activity   • Alcohol use: Not Currently   • Drug use: Never   • Sexual activity: Defer       Review of Systems   All other systems reviewed and are negative.      Objective   Blood pressure 100/52, temperature 97.6 °F (36.4 °C),  "height 162.6 cm (64\"), weight 71.2 kg (157 lb).  Body mass index is 26.95 kg/m².    Physical Exam  Constitutional:       Appearance: Normal appearance.   Pulmonary:      Effort: Pulmonary effort is normal.   Musculoskeletal:      Comments: Hand dexterity and  strength are intact and equal bilaterally   Skin:     Comments: Left wrist incision is clean dry and intact with no swelling noted today.  Incision was cleaned well with ChloraPrep and sutures were removed.   Neurological:      Mental Status: She is alert.      Comments: No left-sided hand numbness noted in office today         Assessment/Plan   Ms. Humphrey is doing very well following her left carpal tunnel release on 12/22/2021.  She has had excellent relief of her preop numbness and tingling.  Her incision is healing well.  She was given instruction on wound care and restrictions going forward.  She will continue to keep the incision clean and dry for at least another week.  We will plan to see her on an as-needed basis going forward.    Diagnoses and all orders for this visit:    1. Carpal tunnel syndrome of left wrist (Primary)    2. S/P carpal tunnel release  Comments:  left hand      Return if symptoms worsen or fail to improve.     Divine Flores PA-C              "

## 2022-02-08 ENCOUNTER — TRANSCRIBE ORDERS (OUTPATIENT)
Dept: ADMINISTRATIVE | Facility: HOSPITAL | Age: 47
End: 2022-02-08

## 2022-02-08 DIAGNOSIS — Z12.31 VISIT FOR SCREENING MAMMOGRAM: Primary | ICD-10-CM

## 2022-03-02 ENCOUNTER — TELEPHONE (OUTPATIENT)
Dept: NEUROSURGERY | Facility: CLINIC | Age: 47
End: 2022-03-02

## 2022-03-02 NOTE — TELEPHONE ENCOUNTER
Provider:  Sina  Caller: Patient  Time of call:   2:02  Phone #:  [phone]  Surgery:  CARPAL TUNNEL RELEASE LEFT  Surgery Date:  Surgery with Oneal Andino MD (12/22/2021)    Last visit:   Office Visit with Divine Flores PA-C (01/05/2022)    Next visit: maggi OBREGON:         Reason for call:         Patient called in stating she has some swelling and a spot the size of about a BB on her incision scar that is very tender. Wants to be looked at to make sure everything is okay.

## 2022-03-03 NOTE — TELEPHONE ENCOUNTER
Attempted to contact patient but no answer. LVM to call us back with an update on how she is doing or we will reach out to her tomorrow.

## 2022-04-11 ENCOUNTER — HOSPITAL ENCOUNTER (OUTPATIENT)
Dept: MAMMOGRAPHY | Facility: HOSPITAL | Age: 47
Discharge: HOME | End: 2022-04-11

## 2022-04-11 DIAGNOSIS — Z12.31 VISIT FOR SCREENING MAMMOGRAM: ICD-10-CM

## 2024-02-16 ENCOUNTER — HOSPITAL ENCOUNTER (EMERGENCY)
Facility: HOSPITAL | Age: 49
Discharge: HOME | End: 2024-02-16
Attending: EMERGENCY MEDICINE
Payer: MEDICAID

## 2024-02-16 VITALS
SYSTOLIC BLOOD PRESSURE: 131 MMHG | TEMPERATURE: 98.4 F | HEART RATE: 74 BPM | WEIGHT: 140 LBS | HEIGHT: 64 IN | RESPIRATION RATE: 20 BRPM | OXYGEN SATURATION: 99 % | DIASTOLIC BLOOD PRESSURE: 72 MMHG | BODY MASS INDEX: 23.9 KG/M2

## 2024-02-16 DIAGNOSIS — K04.7 PERIAPICAL ABSCESS: Primary | ICD-10-CM

## 2024-02-16 DIAGNOSIS — K08.89 PAIN, DENTAL: ICD-10-CM

## 2024-02-16 PROCEDURE — 99282 EMERGENCY DEPT VISIT SF MDM: CPT

## 2024-02-16 RX ORDER — OXYCODONE HYDROCHLORIDE AND ACETAMINOPHEN 5; 325 MG/1; MG/1
1 TABLET ORAL EVERY 6 HOURS PRN
Qty: 12 TABLET | Refills: 0 | Status: SHIPPED | OUTPATIENT
Start: 2024-02-16 | End: 2024-02-16 | Stop reason: SDUPTHER

## 2024-02-16 RX ORDER — OXYCODONE HYDROCHLORIDE AND ACETAMINOPHEN 5; 325 MG/1; MG/1
1 TABLET ORAL EVERY 6 HOURS PRN
Qty: 12 TABLET | Refills: 0 | Status: SHIPPED | OUTPATIENT
Start: 2024-02-16

## 2024-02-16 RX ORDER — AMOXICILLIN AND CLAVULANATE POTASSIUM 875; 125 MG/1; MG/1
1 TABLET, FILM COATED ORAL EVERY 12 HOURS
Qty: 14 TABLET | Refills: 0 | Status: SHIPPED | OUTPATIENT
Start: 2024-02-16 | End: 2024-02-17 | Stop reason: SDUPTHER

## 2024-02-16 NOTE — Clinical Note
[facility] EMERGENCY DEPARTMENT  [address]  [facility] [address]  Phone: [phone]    Nisha Lou was seen and treated in our emergency department on 2/16/2024.  She may return to work on 02/19/2024.         Thank you for choosing [facility].    Mariela Maurer MD

## 2024-02-16 NOTE — Clinical Note
[facility] EMERGENCY DEPARTMENT  [address]  [facility] [address]  Phone: [phone]    Nisha Lou was seen and treated in our emergency department on 2/16/2024.  She may return to work on 02/19/2024.         Thank you for choosing [facility].    Mariela Maurer MD

## 2024-02-16 NOTE — DISCHARGE INSTRUCTIONS
Take antibiotics as prescribed.    Apply cool compress over face.    Take pain medication as needed for severe pain not controlled by Tylenol or ibuprofen.    Follow-up with dentist as scheduled.

## 2024-02-16 NOTE — ED PROVIDER NOTES
Subjective   History of Present Illness  48-year-old female who presents with a complaint of swelling over the right maxillary face.  In association with that she also reports some dental pain.  She actually had a previous dental procedure performed in November.  She denies any recent dental intervention.  She denies fever or systemic symptoms of infection.  No difficulty swallowing or breathing.  No chest pain or abdominal pain.  No change in bowel or urinary function.  She is not immunocompromise.  No other acute complaints.      Review of Systems   Constitutional:  Negative for chills, fatigue and fever.   HENT:  Positive for dental problem and facial swelling. Negative for congestion, ear pain, postnasal drip, sinus pressure and sore throat.    Eyes:  Negative for pain, redness and visual disturbance.   Respiratory:  Negative for cough, chest tightness and shortness of breath.    Cardiovascular:  Negative for chest pain, palpitations and leg swelling.   Gastrointestinal:  Negative for abdominal pain, anal bleeding, blood in stool, diarrhea, nausea and vomiting.   Endocrine: Negative for polydipsia and polyuria.   Genitourinary:  Negative for difficulty urinating, dysuria, frequency and urgency.   Musculoskeletal:  Negative for arthralgias, back pain and neck pain.   Skin:  Negative for pallor and rash.   Allergic/Immunologic: Negative for environmental allergies and immunocompromised state.   Neurological:  Negative for dizziness, weakness and headaches.   Hematological:  Negative for adenopathy.   Psychiatric/Behavioral:  Negative for confusion, self-injury and suicidal ideas. The patient is not nervous/anxious.    All other systems reviewed and are negative.      Past Medical History:   Diagnosis Date    Anemia     Asthma     Back problem     Depression     GERD (gastroesophageal reflux disease)     Headache     Hypertension        No Known Allergies    Past Surgical History:   Procedure Laterality Date     APPENDECTOMY  2017    AUGMENTATION MAMMAPLASTY      -SALINE    CARPAL TUNNEL RELEASE Left 2021    Procedure: CARPAL TUNNEL RELEASE LEFT;  Surgeon: Oneal Andino MD;  Location: [facility];  Service: Neurosurgery;  Laterality: Left;     SECTION      x3    COLONOSCOPY         Family History   Problem Relation Age of Onset    Breast cancer Mother 49    Cancer Mother     Hypertension Mother     Diabetes Mother     Asthma Maternal Grandmother     Cancer Maternal Grandmother     Heart disease Maternal Grandmother     Hypertension Maternal Grandmother     Diabetes Maternal Grandmother     Ovarian cancer Neg Hx        Social History     Socioeconomic History    Marital status: Single   Tobacco Use    Smoking status: Never    Smokeless tobacco: Never   Vaping Use    Vaping Use: Never used   Substance and Sexual Activity    Alcohol use: Not Currently    Drug use: Never    Sexual activity: Defer           Objective   Physical Exam  Vitals and nursing note reviewed.   Constitutional:       General: She is not in acute distress.     Appearance: Normal appearance. She is well-developed. She is not toxic-appearing or diaphoretic.   HENT:      Head: Normocephalic and atraumatic.      Comments: Over the right maxillary face just above the right upper jaw there is an area of swelling.  It is tender to palpation of this area.  The patient also has tenderness to percussion over the teeth of the right upper jaw.  No purulent drainage.  Posterior oropharynx is patent.     Right Ear: External ear normal.      Left Ear: External ear normal.      Nose: Nose normal.      Mouth/Throat:      Dentition: Normal dentition. Dental tenderness present. No dental caries or gum lesions.   Eyes:      General: Lids are normal.      Pupils: Pupils are equal, round, and reactive to light.   Neck:      Trachea: No tracheal deviation.   Cardiovascular:      Rate and Rhythm: Normal rate and regular rhythm.      Pulses: No decreased  pulses.      Heart sounds: Normal heart sounds. No murmur heard.     No friction rub. No gallop.   Pulmonary:      Effort: Pulmonary effort is normal. No respiratory distress.      Breath sounds: Normal breath sounds. No decreased breath sounds, wheezing, rhonchi or rales.   Abdominal:      General: Bowel sounds are normal.      Palpations: Abdomen is soft.      Tenderness: There is no abdominal tenderness. There is no guarding or rebound.   Musculoskeletal:         General: No deformity. Normal range of motion.      Cervical back: Normal range of motion and neck supple.   Lymphadenopathy:      Cervical: No cervical adenopathy.   Skin:     General: Skin is warm and dry.      Findings: No rash.   Neurological:      Mental Status: She is alert and oriented to person, place, and time.      Cranial Nerves: No cranial nerve deficit.      Sensory: No sensory deficit.   Psychiatric:         Speech: Speech normal.         Behavior: Behavior normal.         Thought Content: Thought content normal.         Judgment: Judgment normal.         Procedures           ED Course                                             Medical Decision Making  Differential diagnosis includes periapical abscess, dental caries, sinusitis, skin abscess.    No fluctuance to palpation of the skin.  Tenderness to palpation over the right upper jaw.    Presentation is consistent with a periapical abscess.  The patient will be discharged with the advised to apply cool compresses, pain medication, and antibiotics    Advised to follow-up with dentistry within the next week.    Problems Addressed:  Pain, dental: complicated acute illness or injury with systemic symptoms  Periapical abscess: complicated acute illness or injury with systemic symptoms    Risk  Prescription drug management.        Final diagnoses:   Periapical abscess   Pain, dental       ED Disposition  ED Disposition       ED Disposition   Discharge    Condition   Stable    Comment   --                Rosemary Dahl, APRN  [address] [place] [address]  [phone]    In 1 week           Medication List        New Prescriptions      amoxicillin-clavulanate 875-125 MG per tablet  Commonly known as: AUGMENTIN  Take 1 tablet by mouth Every 12 (Twelve) Hours for 7 days.     oxyCODONE-acetaminophen 5-325 MG per tablet  Commonly known as: PERCOCET  Take 1 tablet by mouth Every 6 (Six) Hours As Needed for Moderate Pain or Severe Pain for up to 12 doses.               Where to Get Your Medications        These medications were sent to [id number] - [place], [place] - [address] AT [facility] - [phone] [facility] [phone]   [address] [facility] [address]      Phone: [phone]   oxyCODONE-acetaminophen 5-325 MG per tablet       These medications were sent to [facility] [address] [place] [address] [address] [facility] [phone] [facility] [phone] [place] [id number]      Phone: [phone]   amoxicillin-clavulanate 875-125 MG per tablet            Mariela Maurer MD  02/16/24 7552

## 2024-02-16 NOTE — Clinical Note
[facility] EMERGENCY DEPARTMENT  [address]  [facility] [address]  Phone: [phone]    Nisha Lou was seen and treated in our emergency department on 2/16/2024.  She may return to work on 02/19/2024.         Thank you for choosing [facility].    Mariela Maurer MD

## 2024-02-17 ENCOUNTER — NURSE TRIAGE (OUTPATIENT)
Dept: CALL CENTER | Facility: HOSPITAL | Age: 49
End: 2024-02-17
Payer: MEDICAID

## 2024-02-17 RX ORDER — AMOXICILLIN AND CLAVULANATE POTASSIUM 875; 125 MG/1; MG/1
1 TABLET, FILM COATED ORAL EVERY 12 HOURS
Qty: 14 TABLET | Refills: 0 | Status: SHIPPED | OUTPATIENT
Start: 2024-02-17 | End: 2024-02-24

## 2024-09-11 ENCOUNTER — TRANSCRIBE ORDERS (OUTPATIENT)
Dept: ADMINISTRATIVE | Facility: HOSPITAL | Age: 49
End: 2024-09-11
Payer: MEDICAID

## 2024-09-11 DIAGNOSIS — Z12.31 VISIT FOR SCREENING MAMMOGRAM: Primary | ICD-10-CM

## 2024-09-20 ENCOUNTER — HOSPITAL ENCOUNTER (OUTPATIENT)
Dept: MAMMOGRAPHY | Facility: HOSPITAL | Age: 49
Discharge: HOME | End: 2024-09-20
Admitting: NURSE PRACTITIONER
Payer: MEDICAID

## 2024-09-20 DIAGNOSIS — Z12.31 VISIT FOR SCREENING MAMMOGRAM: ICD-10-CM

## 2024-09-20 LAB
NCCN CRITERIA FLAG: ABNORMAL
TYRER CUZICK SCORE: 16.6

## 2024-09-20 PROCEDURE — 77063 BREAST TOMOSYNTHESIS BI: CPT

## 2024-09-20 PROCEDURE — 77067 SCR MAMMO BI INCL CAD: CPT

## (undated) DEVICE — UNDERGLV SURG BIOGEL INDICAT PI SZ8 BLU

## (undated) DEVICE — 3M™ MEDIPORE™ H SOFT CLOTH SURGICAL TAPE, 2863, 3 IN X 10 YD, 12/CASE: Brand: 3M™ MEDIPORE™

## (undated) DEVICE — STCKNT STRL 4X48 IN

## (undated) DEVICE — SYR CONTRL LUERLOK 10CC

## (undated) DEVICE — DRSNG TELFA PAD NONADH STR 1S 3X8IN

## (undated) DEVICE — PK EXTREM UPPR 10

## (undated) DEVICE — NDL HYPO ECLPS SFTY 25G 1 1/2IN

## (undated) DEVICE — GLV SURG SIGNATURE TOUCH PF LTX 7.5 STRL

## (undated) DEVICE — DISPOSABLE BIPOLAR FORCEPS 5 1/2" (14CM) SEMKIN, 0.7MM TIP AND 12 FT. (3.6M) CABLE: Brand: KIRWAN